# Patient Record
Sex: FEMALE | Race: OTHER | HISPANIC OR LATINO | Employment: FULL TIME | URBAN - METROPOLITAN AREA
[De-identification: names, ages, dates, MRNs, and addresses within clinical notes are randomized per-mention and may not be internally consistent; named-entity substitution may affect disease eponyms.]

---

## 2023-10-29 RX ORDER — SODIUM CHLORIDE, SODIUM LACTATE, POTASSIUM CHLORIDE, CALCIUM CHLORIDE 600; 310; 30; 20 MG/100ML; MG/100ML; MG/100ML; MG/100ML
75 INJECTION, SOLUTION INTRAVENOUS CONTINUOUS
Status: CANCELLED | OUTPATIENT
Start: 2023-10-29

## 2023-11-02 NOTE — H&P (VIEW-ONLY)
Assessment  Diagnoses and all orders for this visit:    Complex tear of medial meniscus of left knee as current injury, initial encounter    Discussion and Plan:    Explained to the patient that her MRI of the left knee reveals a medial meniscus tear with minimal osteoarthritis. She has tried and failed non operative treatments including a trial of physical therapy as well as an intra-articular corticosteroid injection. Given her persistence of symptoms despite this treatment and the findings of the MRI scan, the next step in the treatment process would be to perform an arthroscopic procedure to address the meniscal tear in the form of arthroscopic partial medial meniscectomy. She understood and all questions were answered. A thorough discussion was performed with the patient regarding the risks and benefits of the procedure. Risks specific to this procedure were discussed include but are not limited to infection, neurovascular injury, risk of anesthesia, recurrent tear of meniscus, persistent pain, no help for pre-existing osteoarthritis symptoms, as well as the need for further surgery. After this discussion all questions were answered and informed consent was obtained in the office for arthroscopic partial medial meniscectomy of the left knee. Follow up post operatively with Ovidio Mitchell PA-C    Subjective:   Patient ID: Neftaly Humphrey is a 50 y.o. female presents to the office in follow up of her left knee as well as to discuss MRI results. She was provided a steroid injection on 8/22/23 and a referral to physical therapy. She reports several days of moderate relief with the injection. She notes some improvement with the physical therapy but she continues to have left knee pain that interferes with ambulation. Pain is localized to the medial joint line. Pain is worse with stairs. Pain interferes with sleep - cannot lay with knees touching. Denies mechanical symptoms.   She denies new injury or trauma. The following portions of the patient's history were reviewed and updated as appropriate: allergies, current medications, past family history, past medical history, past social history, past surgical history and problem list.    Objective:  /77 (BP Location: Left arm, Patient Position: Sitting, Cuff Size: Standard)   Pulse 87   Ht 5' 3" (1.6 m)   Wt 88.9 kg (196 lb)   BMI 34.72 kg/m²       Left Knee Exam     Tenderness   The patient is experiencing tenderness in the medial joint line. Range of Motion   The patient has normal left knee ROM. Tests   Todd:  Medial - positive   Varus: negative Valgus: negative  Lachman:  Anterior - negative        Other   Erythema: absent  Sensation: normal  Pulse: present  Effusion: no effusion present            Physical Exam  Vitals and nursing note reviewed. Constitutional:       Appearance: She is well-developed. HENT:      Head: Normocephalic and atraumatic. Eyes:      Pupils: Pupils are equal, round, and reactive to light. Cardiovascular:      Rate and Rhythm: Normal rate and regular rhythm. Pulses: Normal pulses. Heart sounds: Normal heart sounds. Pulmonary:      Effort: Pulmonary effort is normal. No respiratory distress. Breath sounds: Normal breath sounds. Abdominal:      General: There is no distension. Palpations: Abdomen is soft. Musculoskeletal:      Cervical back: Normal range of motion and neck supple. Left knee: No effusion. Instability Tests: Medial Todd test positive. Skin:     General: Skin is warm and dry. Neurological:      Mental Status: She is alert and oriented to person, place, and time. Psychiatric:         Mood and Affect: Mood normal.         Behavior: Behavior normal.         Thought Content: Thought content normal.         Judgment: Judgment normal.       I have personally reviewed pertinent films in PACS and my interpretation is as follows.     MRI Left Knee 10/24/23: Horizontal undersurface tear of the medial meniscus. Minimal osteoarthritis.      Scribe Attestation      I,:  Lino Wong am acting as a scribe while in the presence of the attending physician.:       I,:  Nyle Fothergill, MD personally performed the services described in this documentation    as scribed in my presence.:

## 2023-11-07 PROCEDURE — 88305 TISSUE EXAM BY PATHOLOGIST: CPT | Performed by: STUDENT IN AN ORGANIZED HEALTH CARE EDUCATION/TRAINING PROGRAM

## 2023-11-07 PROCEDURE — 88342 IMHCHEM/IMCYTCHM 1ST ANTB: CPT | Performed by: STUDENT IN AN ORGANIZED HEALTH CARE EDUCATION/TRAINING PROGRAM

## 2023-11-07 PROCEDURE — 88341 IMHCHEM/IMCYTCHM EA ADD ANTB: CPT | Performed by: STUDENT IN AN ORGANIZED HEALTH CARE EDUCATION/TRAINING PROGRAM

## 2023-11-23 ENCOUNTER — ANESTHESIA EVENT (OUTPATIENT)
Dept: PERIOP | Facility: AMBULARY SURGERY CENTER | Age: 48
End: 2023-11-23
Payer: COMMERCIAL

## 2023-11-28 NOTE — PRE-PROCEDURE INSTRUCTIONS
Pre-Surgery Instructions:   Medication Instructions    albuterol (PROVENTIL HFA,VENTOLIN HFA) 90 mcg/act inhaler Uses PRN- OK to take day of surgery    cetirizine (ZyrTEC) 10 MG chewable tablet Hold day of surgery. Cholecalciferol (Vitamin D3) 75 MCG (3000 UT) TABS Stop taking 7 days prior to surgery. montelukast (SINGULAIR) 10 mg tablet Take day of surgery. omeprazole (PriLOSEC) 40 MG capsule Take day of surgery. vitamin B-12 (VITAMIN B-12) 1,000 mcg tablet Stop taking 7 days prior to surgery. Medication instructions for day surgery reviewed. Please use only a sip of water to take your instructed medications. Avoid all over the counter vitamins, supplements and NSAIDS for one week prior to surgery per anesthesia guidelines. Tylenol is ok to take as needed. You will receive a call one business day prior to surgery with an arrival time and hospital directions. If your surgery is scheduled on a Monday, the hospital will be calling you on the Friday prior to your surgery. If you have not heard from anyone by 8pm, please call the hospital supervisor through the hospital  at [phone]. Leana Srinivasan [phone]). Do not eat or drink anything after midnight the night before your surgery, including candy, mints, lifesavers, or chewing gum. Do not drink alcohol 24hrs before your surgery. Try not to smoke at least 24hrs before your surgery. Follow the pre surgery showering instructions as listed in the [facility] Surgical Experience Booklet” or otherwise provided by your surgeon's office. Do not use a blade to shave the surgical area 1 week before surgery. It is okay to use a clean electric clippers up to 24 hours before surgery. Do not apply any lotions, creams, including makeup, cologne, deodorant, or perfumes after showering on the day of your surgery. Do not use dry shampoo, hair spray, hair gel, or any type of hair products. No contact lenses, eye make-up, or artificial eyelashes.  Remove nail polish, including gel polish, and any artificial, gel, or acrylic nails if possible. Remove all jewelry including rings and body piercing jewelry. Wear causal clothing that is easy to take on and off. Consider your type of surgery. Keep any valuables, jewelry, piercings at home. Please bring any specially ordered equipment (sling, braces) if indicated. Arrange for a responsible person to drive you to and from the hospital on the day of your surgery. Visitor Guidelines discussed. Call the surgeon's office with any new illnesses, exposures, or additional questions prior to surgery. Please reference your [facility] Surgical Experience Booklet” for additional information to prepare for your upcoming surgery. Pt instructed to stop nsaids and supplements one week prior to surgery. Pt verbalized understanding of shower and med instructions.

## 2023-12-01 ENCOUNTER — ANESTHESIA (OUTPATIENT)
Dept: PERIOP | Facility: AMBULARY SURGERY CENTER | Age: 48
End: 2023-12-01
Payer: COMMERCIAL

## 2023-12-01 ENCOUNTER — HOSPITAL ENCOUNTER (OUTPATIENT)
Facility: AMBULARY SURGERY CENTER | Age: 48
Setting detail: OUTPATIENT SURGERY
Discharge: HOME | End: 2023-12-01
Attending: ORTHOPAEDIC SURGERY | Admitting: ORTHOPAEDIC SURGERY
Payer: COMMERCIAL

## 2023-12-01 VITALS
TEMPERATURE: 97.9 F | RESPIRATION RATE: 18 BRPM | HEIGHT: 63 IN | OXYGEN SATURATION: 100 % | DIASTOLIC BLOOD PRESSURE: 69 MMHG | SYSTOLIC BLOOD PRESSURE: 117 MMHG | WEIGHT: 196 LBS | BODY MASS INDEX: 34.73 KG/M2 | HEART RATE: 71 BPM

## 2023-12-01 DIAGNOSIS — S83.232D COMPLEX TEAR OF MEDIAL MENISCUS OF LEFT KNEE AS CURRENT INJURY, SUBSEQUENT ENCOUNTER: Primary | ICD-10-CM

## 2023-12-01 DIAGNOSIS — M17.12 PRIMARY OSTEOARTHRITIS OF LEFT KNEE: ICD-10-CM

## 2023-12-01 PROBLEM — S83.232A COMPLEX TEAR OF MEDIAL MENISCUS OF LEFT KNEE AS CURRENT INJURY: Status: ACTIVE | Noted: 2023-10-09

## 2023-12-01 LAB
GLUCOSE SERPL-MCNC: 100 MG/DL (ref 65–140)
GLUCOSE SERPL-MCNC: 105 MG/DL (ref 65–140)

## 2023-12-01 PROCEDURE — C9290 INJ, BUPIVACAINE LIPOSOME: HCPCS | Performed by: ANESTHESIOLOGY

## 2023-12-01 PROCEDURE — 29881 ARTHRS KNE SRG MNISECTMY M/L: CPT | Performed by: ORTHOPAEDIC SURGERY

## 2023-12-01 PROCEDURE — 29881 ARTHRS KNE SRG MNISECTMY M/L: CPT | Performed by: PHYSICIAN ASSISTANT

## 2023-12-01 PROCEDURE — 82948 REAGENT STRIP/BLOOD GLUCOSE: CPT

## 2023-12-01 RX ORDER — FENTANYL CITRATE 50 UG/ML
INJECTION, SOLUTION INTRAMUSCULAR; INTRAVENOUS AS NEEDED
Status: DISCONTINUED | OUTPATIENT
Start: 2023-12-01 | End: 2023-12-01

## 2023-12-01 RX ORDER — BUPIVACAINE HYDROCHLORIDE 2.5 MG/ML
INJECTION, SOLUTION EPIDURAL; INFILTRATION; INTRACAUDAL AS NEEDED
Status: DISCONTINUED | OUTPATIENT
Start: 2023-12-01 | End: 2023-12-01

## 2023-12-01 RX ORDER — FENTANYL CITRATE/PF 50 MCG/ML
25 SYRINGE (ML) INJECTION
Status: DISCONTINUED | OUTPATIENT
Start: 2023-12-01 | End: 2023-12-01 | Stop reason: HOSPADM

## 2023-12-01 RX ORDER — BUPIVACAINE HYDROCHLORIDE 2.5 MG/ML
INJECTION, SOLUTION EPIDURAL; INFILTRATION; INTRACAUDAL AS NEEDED
Status: DISCONTINUED | OUTPATIENT
Start: 2023-12-01 | End: 2023-12-01 | Stop reason: HOSPADM

## 2023-12-01 RX ORDER — DEXAMETHASONE SODIUM PHOSPHATE 10 MG/ML
INJECTION, SOLUTION INTRAMUSCULAR; INTRAVENOUS AS NEEDED
Status: DISCONTINUED | OUTPATIENT
Start: 2023-12-01 | End: 2023-12-01

## 2023-12-01 RX ORDER — SODIUM CHLORIDE, SODIUM LACTATE, POTASSIUM CHLORIDE, CALCIUM CHLORIDE 600; 310; 30; 20 MG/100ML; MG/100ML; MG/100ML; MG/100ML
75 INJECTION, SOLUTION INTRAVENOUS CONTINUOUS
Status: DISCONTINUED | OUTPATIENT
Start: 2023-12-01 | End: 2023-12-01 | Stop reason: HOSPADM

## 2023-12-01 RX ORDER — MIDAZOLAM HYDROCHLORIDE 2 MG/2ML
INJECTION, SOLUTION INTRAMUSCULAR; INTRAVENOUS AS NEEDED
Status: DISCONTINUED | OUTPATIENT
Start: 2023-12-01 | End: 2023-12-01

## 2023-12-01 RX ORDER — FENTANYL CITRATE/PF 50 MCG/ML
25 SYRINGE (ML) INJECTION
Status: DISCONTINUED | OUTPATIENT
Start: 2023-12-01 | End: 2023-12-01

## 2023-12-01 RX ORDER — SODIUM CHLORIDE, SODIUM LACTATE, POTASSIUM CHLORIDE, CALCIUM CHLORIDE 600; 310; 30; 20 MG/100ML; MG/100ML; MG/100ML; MG/100ML
INJECTION, SOLUTION INTRAVENOUS CONTINUOUS PRN
Status: DISCONTINUED | OUTPATIENT
Start: 2023-12-01 | End: 2023-12-01

## 2023-12-01 RX ORDER — ACETAMINOPHEN 325 MG/1
650 TABLET ORAL EVERY 6 HOURS PRN
Status: DISCONTINUED | OUTPATIENT
Start: 2023-12-01 | End: 2023-12-01 | Stop reason: HOSPADM

## 2023-12-01 RX ORDER — OXYCODONE HYDROCHLORIDE 5 MG/1
5 TABLET ORAL EVERY 4 HOURS PRN
Status: DISCONTINUED | OUTPATIENT
Start: 2023-12-01 | End: 2023-12-01 | Stop reason: HOSPADM

## 2023-12-01 RX ORDER — OXYCODONE HYDROCHLORIDE 5 MG/1
TABLET ORAL
Qty: 8 TABLET | Refills: 0 | Status: SHIPPED | OUTPATIENT
Start: 2023-12-01

## 2023-12-01 RX ORDER — PROPOFOL 10 MG/ML
INJECTION, EMULSION INTRAVENOUS AS NEEDED
Status: DISCONTINUED | OUTPATIENT
Start: 2023-12-01 | End: 2023-12-01

## 2023-12-01 RX ORDER — CEFAZOLIN SODIUM 2 G/50ML
2000 SOLUTION INTRAVENOUS ONCE
Status: COMPLETED | OUTPATIENT
Start: 2023-12-01 | End: 2023-12-01

## 2023-12-01 RX ORDER — ONDANSETRON 2 MG/ML
4 INJECTION INTRAMUSCULAR; INTRAVENOUS ONCE AS NEEDED
Status: DISCONTINUED | OUTPATIENT
Start: 2023-12-01 | End: 2023-12-01 | Stop reason: HOSPADM

## 2023-12-01 RX ORDER — ONDANSETRON 2 MG/ML
INJECTION INTRAMUSCULAR; INTRAVENOUS AS NEEDED
Status: DISCONTINUED | OUTPATIENT
Start: 2023-12-01 | End: 2023-12-01

## 2023-12-01 RX ORDER — ONDANSETRON 2 MG/ML
4 INJECTION INTRAMUSCULAR; INTRAVENOUS EVERY 6 HOURS PRN
Status: DISCONTINUED | OUTPATIENT
Start: 2023-12-01 | End: 2023-12-01 | Stop reason: HOSPADM

## 2023-12-01 RX ADMIN — DEXAMETHASONE SODIUM PHOSPHATE 10 MG: 10 INJECTION, SOLUTION INTRAMUSCULAR; INTRAVENOUS at 07:50

## 2023-12-01 RX ADMIN — OXYCODONE HYDROCHLORIDE 5 MG: 5 TABLET ORAL at 09:16

## 2023-12-01 RX ADMIN — FENTANYL CITRATE 25 MCG: 50 INJECTION INTRAMUSCULAR; INTRAVENOUS at 08:00

## 2023-12-01 RX ADMIN — FENTANYL CITRATE 50 MCG: 50 INJECTION INTRAMUSCULAR; INTRAVENOUS at 07:18

## 2023-12-01 RX ADMIN — SODIUM CHLORIDE, SODIUM LACTATE, POTASSIUM CHLORIDE, AND CALCIUM CHLORIDE: .6; .31; .03; .02 INJECTION, SOLUTION INTRAVENOUS at 07:21

## 2023-12-01 RX ADMIN — BUPIVACAINE 20 ML: 13.3 INJECTION, SUSPENSION, LIPOSOMAL INFILTRATION at 07:21

## 2023-12-01 RX ADMIN — ONDANSETRON 4 MG: 2 INJECTION INTRAMUSCULAR; INTRAVENOUS at 07:50

## 2023-12-01 RX ADMIN — MIDAZOLAM 2 MG: 1 INJECTION INTRAMUSCULAR; INTRAVENOUS at 07:18

## 2023-12-01 RX ADMIN — PROPOFOL 200 MG: 10 INJECTION, EMULSION INTRAVENOUS at 07:41

## 2023-12-01 RX ADMIN — CEFAZOLIN SODIUM 2000 MG: 2 SOLUTION INTRAVENOUS at 07:38

## 2023-12-01 RX ADMIN — BUPIVACAINE HYDROCHLORIDE 10 ML: 2.5 INJECTION, SOLUTION EPIDURAL; INFILTRATION; INTRACAUDAL; PERINEURAL at 07:21

## 2023-12-01 RX ADMIN — PROPOFOL 100 MG: 10 INJECTION, EMULSION INTRAVENOUS at 07:43

## 2023-12-01 RX ADMIN — ACETAMINOPHEN 650 MG: 325 TABLET, FILM COATED ORAL at 09:16

## 2023-12-01 NOTE — ANESTHESIA PROCEDURE NOTES
Peripheral Block    Patient location during procedure: holding area  Start time: 12/1/2023 7:21 AM  Reason for block: at surgeon's request and post-op pain management  Staffing  Performed by: Janeth Mariano MD  Authorized by: Janeth Mariano MD    Preanesthetic Checklist  Completed: patient identified, IV checked, site marked, risks and benefits discussed, surgical consent, monitors and equipment checked, pre-op evaluation and timeout performed  Peripheral Block  Patient position: supine  Prep: ChloraPrep  Patient monitoring: frequent blood pressure checks, continuous pulse oximetry and heart rate  Block type: Adductor Canal  Laterality: left  Injection technique: single-shot  Procedures: ultrasound guided, Ultrasound guidance required for the procedure to increase accuracy and safety of medication placement and decrease risk of complications.   Ultrasound permanent image saved  Needle  Needle type: Stimuplex   Needle gauge: 20 G  Needle length: 4 in  Needle localization: anatomical landmarks and ultrasound guidance  Assessment  Injection assessment: incremental injection, frequent aspiration, injected with ease, negative aspiration, negative for heart rate change, no paresthesia on injection, no symptoms of intraneural/intravenous injection and needle tip visualized at all times  Paresthesia pain: none  Post-procedure:  site cleaned  patient tolerated the procedure well with no immediate complications

## 2023-12-01 NOTE — ANESTHESIA POSTPROCEDURE EVALUATION
Post-Op Assessment Note    CV Status:  Stable    Pain management: adequate       Mental Status:  Alert and awake   Hydration Status:  Euvolemic   PONV Controlled:  Controlled   Airway Patency:  Patent     Post Op Vitals Reviewed: Yes    No anethesia notable event occurred.     Staff: CRNA               BP   108/54   Temp  97.5   Pulse  77   Resp   17   SpO2   96

## 2023-12-01 NOTE — OP NOTE
OPERATIVE REPORT  PATIENT NAME: Alexandria Maldonado    :  1975  MRN: [id number]  Pt Location: [facility] OR ROOM 06    SURGERY DATE: 2023     SURGEON: Alexsander Angelo MD     ASSISTANT: Amy Watts PA-C     NOTE: Amy Watts PA-C was present for the entire procedure and provided essential assistance with patient positioning, prepping, draping, Patient positioning, draping, wound closure, sterile dressing application under my direct supervision    NOTE: No qualified resident physician was available for assistance    PRE-OP DIAGNOSIS: Left Knee Medial Meniscus Tear    POST-OP DIAGNOSIS: Same    PROCEDURE(S): Surgical Arthroscopy Left Knee with Partial Medial Meniscectomy    ANESTHESIA STAFF: Taras Moy MD     ANESTHESIA TYPE: General with LMA with ultrasound guided adductor canal block. The adductor canal block was provided by the anesthesia staff per my request for postoperative pain control and to decrease the use of postoperative narcotic medication for pain control. COMPLICATIONS: None    FINDINGS: Medial Meniscus Tear    SPECIMEN(S):  None    ESTIMATED BLOOD LOSS: Minimal    OPERATIVE INDICATIONS:  Patient is a 50 y.o.  female with left knee pain and MRI scan showing a medial meniscus tear. After a thorough discussion of the risks and benefits of the procedure as well as alternatives to the procedure the patient elected for surgical arthroscopy left knee with partial medial meniscectomy. PROCEDURE AND TECHNIQUE:  On the day of surgery I identified the patient's left knee and marked it with my initials. The patient was taken back to the operating room where general anesthesia was induced with a laryngeal mask airway and 2 grams of IV Cefazolin were given. The patient was positioned in the supine position, the knee was examined and found to have no effusion with full range of motion and no instability.  A tourniquet was not utilized and after a timeout for safety a standard anterolateral arthroscopic portal was made. No effusion was returned. Patellofemoral evaluation was performed and no articular cartilage injury was seen with a centrally tracking patella. Medial compartment was entered and found to have mild grade 2 changes of the articular cartilage of the medial femoral condyle with a complex tear of the posterior horn to the body of the medial meniscus. A partial medial meniscectomy was performed using a biting device and the shaver to a stable border. The ACL and PCL were found to be intact in the inter-condylar notch. Lateral compartment was entered  and found to have no articular cartilage injury with no lateral meniscus tear. No loose body was seen in the medial or lateral gutter or in the supra-patellar pouch. The knee was irrigated and then the scope was withdrawn. Wounds were closed with 4-0 Monocryl and Steri-Strips and sterile dressings were placed. Patient will be weightbearing with crutches for support. 20 cc of 1/4% bupivacaine was injected for post-op analgesia.       PATIENT DISPOSITION:  Stable to PACU      SIGNATURE: Jian Call MD  DATE: December 1, 2023  TIME: 8:09 AM

## 2023-12-01 NOTE — INTERVAL H&P NOTE
H&P reviewed. After examining the patient I find no changes in the patients condition since the H&P had been written.     Vitals:    12/01/23 0703   BP: 120/78   Pulse: 92   Resp: 18   Temp: (!) 96.5 °F (35.8 °C)   SpO2: 97%

## 2023-12-01 NOTE — DISCHARGE INSTR - AVS FIRST PAGE
What to Expect Before and After Knee Arthroscopy  You are being scheduled for an outpatient knee arthroscopy by Dr. Jessica Coffman. This surgery is done most commonly to treat meniscal tears; however the technique allows Dr Jessica Coffman to treat a variety of problems inside the knee with small incisions and using a scope/camera instrument. Here is some information which may help to answer questions that you may have. Please do not hesitate to reach out to our team to answer questions not addressed here. Before Surgery  You will be contacted the evening prior to your surgery to confirm the scheduled time of the procedure and when to arrive at the hospital.   Do not eat or drink anything after midnight the night before your surgery so that the anesthesia can be performed safely. You typically do not knee any type of brace following the surgery unless specifically instructed by Dr Araseli Wilkins team.  René Vivas will meet the anesthesiologist the morning of the surgery. The surgery is performed under a general anesthetic (“going to sleep”) but they will also offer you a regional block (injection in the leg to numb the leg) to help control your post-operative pain. Unfortunately the block will eventually wear off (typically 12-24 hours after the surgery) but can still be very helpful in managing the pain early on. You will also be provided with a prescription for narcotic pain medication for a short period of time (5 day supply) as some patients require this for post-surgical pain control. We typically will not provide more of that medication at the follow up as to not risk causing dependency and the acute pain should be improved by then. After Surgery  The surgery typically takes 20-30 minutes. When surgery is completed, Dr. Jessica Coffman will update your family and friends on your condition and progress.  You will remain in the recovery room for between 30-60 minutes or until the anesthesia has worn off and your blood pressure and pulse are stable. You will then be brought to the post-op area and see your family/friends. You will be discharged home with crutches and should be able to put all the weight on the leg that you can tolerated. The crutches should be able to be discontinued in 48-72 hours but if you need them for support for a little longer that is fine. As each surgery is unique, Dr Anabell Bauman and his team will be clear if this is to change for your specific procedure. Ice applied to the knee for 20 minutes on and 20 minutes off is helpful to control pain and swelling for some patients and care should be given to make sure that ice is not in direct contact with the skin for fear of causing frostbite. The day after the surgery it is okay to remove the dressings and shower (it is okay if the incisions get wet, just try not to submerge them like in a bath for about 2 weeks following the surgery). If you don’t feel stable without the crutches to shower you can consider using a shower chair (plastic patio chair works well). The incisions will have paper strips covering absorbable sutures and the strips will fall off over time. Make sure you dry everything after showering and a clean ace with a dry bandage (gauze from the pharmacy works great) can be placed after showering to prevent abrasion over the incisions but is not necessarily required. Most patients just leave the wounds open to the air and this is safe to do. It is normal to have some clear or even bloody drainage from the incisions but if you notice a foul odor or purulent drainage from your incision or your temperature goes above 101.5 degrees please contact the office to make sure an infection is not occurring. Pain Control    While pain is an individual experience and difficult to predict, a narcotic pain medication is often required to help manage the pain.   A prescription for this will be provided but only a limited number of pills will be prescribed to help manage the acute phase of recovery. Outside of the acute phase (5 or so days), this medication will not be indicated. In addition to the narcotic pain medication, it is safe to use an anti-inflammatory (unless the patient has a medical condition that would not allow safe use of this mediation). This includes the Advil, Motrin, Ibuprofen and Alleve category of medications. Simply follow the over the counter dosing on the package and use as indicated as another adjunct. Importantly since these medications are all very similar, use only one of them. Tylenol is a separate medication that can be utilized as well and can be taken at the same time as the other medication or given in a "staggered" manner. Just make sure that you follow the dosing on the over the counter bottle instructions. Also make sure that the pain medication prescribed by Dr Baldo Daniel team does not contain acetaminophen (this is found in Percocet and Vicodin). Typically we do not prescribe those types of pain medications but if for some reason that has been prescribed DO NOT add more Tylenol (acetaminophen) as you could end up taking too much of that medication. How to [place] in the First Week  You are encouraged to return to your normal eating and sleeping patterns as soon as possible. It is important for you to be active in order to control your weight and muscle tone so don’t be afraid to move about the house as much as you can tolerate and even consider short walks using the crutches for support if needed. We will limit higher impact activities such as running for 4-6 weeks but gradually increasing your activity is safe as long as your knee does not start swelling up or causing pain. If that does happen back off of the activity. You might be able to return to work within several days however this differs from patient to patient. If your job requires heavy lifting, manual labor or climbing, there may be a delay for several weeks. Your first post-operative appointment will be with Dr. Eli Lazar physician assistant (PA) a few days after the surgery where she will review the intra-operative pictures from the surgery and make sure things are going well. Please understand that it is quite common to still experience pain at that time but the pain should be steadily improving. The majority of our routine knee arthroscopy patients do not require formal Physical Therapy and a simple gradual return to activity is adequate. If you wish to attend PT or the details of you procedure will require you to do so, the first post-op visit is a perfect time to get that scheduled and started. Hopefully this has provided you with information that will help you prepare and get thru the procedure. Please do not hesitate to reach out to our team to answer questions not addressed here. Also remember that these are general guidelines and each surgery is unique so some changes to the above information may be required.

## 2023-12-01 NOTE — ANESTHESIA PREPROCEDURE EVALUATION
Procedure:  KNEE ARTHROSCOPIC PARTIAL MEDIAL MENISCECTOMY, LEFT (Left: Knee)    Relevant Problems   ENDO   (+) Diabetes mellitus, type 2 (HCC)      GI/HEPATIC   (+) Gastroesophageal reflux disease      MUSCULOSKELETAL   (+) Primary osteoarthritis of left knee      PULMONARY   (+) Asthma      BMI 35    Physical Exam    Airway    Mallampati score: II  TM Distance: >3 FB  Neck ROM: full     Dental       Cardiovascular  Cardiovascular exam normal    Pulmonary  Pulmonary exam normal     Other Findings  post-pubertal.      Anesthesia Plan  ASA Score- 3     Anesthesia Type- general with ASA Monitors. Additional Monitors:     Airway Plan: LMA. Comment: + PNB. Plan Factors-    Chart reviewed. EKG reviewed. Existing labs reviewed. Patient summary reviewed. Induction- intravenous. Postoperative Plan-     Informed Consent- Anesthetic plan and risks discussed with patient. I personally reviewed this patient with the CRNA. Discussed and agreed on the Anesthesia Plan with the CRNA. Hakan Rodriguez

## 2023-12-04 ENCOUNTER — OFFICE VISIT (OUTPATIENT)
Dept: OBGYN CLINIC | Facility: OTHER | Age: 48
End: 2023-12-04

## 2023-12-04 VITALS
BODY MASS INDEX: 34.72 KG/M2 | HEIGHT: 63 IN | DIASTOLIC BLOOD PRESSURE: 78 MMHG | HEART RATE: 94 BPM | SYSTOLIC BLOOD PRESSURE: 115 MMHG

## 2023-12-04 DIAGNOSIS — Z48.89 AFTERCARE FOLLOWING SURGERY: ICD-10-CM

## 2023-12-04 DIAGNOSIS — S83.232A COMPLEX TEAR OF MEDIAL MENISCUS OF LEFT KNEE AS CURRENT INJURY, INITIAL ENCOUNTER: Primary | ICD-10-CM

## 2023-12-04 PROCEDURE — 99024 POSTOP FOLLOW-UP VISIT: CPT | Performed by: ORTHOPAEDIC SURGERY

## 2023-12-04 NOTE — PROGRESS NOTES
Assessment  Diagnoses and all orders for this visit:    Complex tear of medial meniscus of left knee as current injury, initial encounter    Aftercare following surgery        Discussion and Plan:    Patient presented on exam today. She is progressing well. I recommend she discontinues her crutches and begin fully weightbearing as tolerated as she needs to get her knee moving. I offered her physical therapy if she needs assistance with this, she kindly declined. I will plan to see her back in 6 weeks for repeat evaluation, sooner if any issues or concerns arise. Subjective:   Patient ID: Mehdi Crapio is a 50 y.o. female      HPI  Patient is here today for follow-up status post left knee arthroscopy, partial medial menisectomy. Patient has been doing well, she has been taking Tylenol around-the-clock for pain with oxycodone as needed. She has been using the crutches to assist with ambulation, she has walked at home without the crutches last few days. She notices an increase amount of pain when she is fully weightbearing. Otherwise, she has been doing well. The following portions of the patient's history were reviewed and updated as appropriate: allergies, current medications, past family history, past medical history, past social history, past surgical history and problem list.    Objective:  /78 (BP Location: Left arm, Patient Position: Sitting, Cuff Size: Large)   Pulse 94   Ht 5' 3" (1.6 m)   BMI 34.72 kg/m²       Left Knee Exam     Range of Motion   Extension:  normal   Flexion:  normal     Other   Erythema: absent  Scars: present  Sensation: normal  Pulse: present  Swelling: none    Comments:  No calf tenderness            Physical Exam  Constitutional:       Appearance: Normal appearance. HENT:      Head: Normocephalic. Pulmonary:      Breath sounds: Normal breath sounds. Neurological:      Mental Status: She is alert and oriented to person, place, and time. Psychiatric:         Behavior: Behavior normal.         Thought Content:  Thought content normal.         Judgment: Judgment normal.           Scribe Attestation      I,:  Valentine Shepherd am acting as a scribe while in the presence of the attending physician.:       I,:  Yesi Davila MD personally performed the services described in this documentation    as scribed in my presence.:

## 2024-05-23 ENCOUNTER — OFFICE VISIT (OUTPATIENT)
Dept: PODIATRY | Facility: CLINIC | Age: 49
End: 2024-05-23
Payer: COMMERCIAL

## 2024-05-23 VITALS — WEIGHT: 202 LBS | RESPIRATION RATE: 18 BRPM | HEIGHT: 63 IN | BODY MASS INDEX: 35.79 KG/M2

## 2024-05-23 DIAGNOSIS — E11.42 DIABETIC POLYNEUROPATHY ASSOCIATED WITH TYPE 2 DIABETES MELLITUS (HCC): Primary | ICD-10-CM

## 2024-05-23 PROCEDURE — 99203 OFFICE O/P NEW LOW 30 MIN: CPT | Performed by: PODIATRIST

## 2024-05-23 RX ORDER — GABAPENTIN 300 MG/1
300 CAPSULE ORAL
Qty: 30 CAPSULE | Refills: 0 | Status: SHIPPED | OUTPATIENT
Start: 2024-05-23 | End: 2024-06-22

## 2024-05-23 NOTE — PROGRESS NOTES
"Ambulatory Visit  Name: Monserrat Smith      : 1975      MRN: [id number]  Encounter Provider: Eric Blue DPM  Encounter Date: 2024   Encounter department: [facility] [place]    Explained to patient that her pedal discomfort is likely due to neuropathy.  Unable to totally rule out plantar fascial pain but it appears as if numbness and tingling is her chief complaint.  For this reason, the patient was placed on gabapentin 300 mg at bedtime.  She will take this medication for 30 days and then be reassessed.  Urged patient to refrain from walking barefoot.  Vascular status is within normal limits but sensorium is diminished significantly per [place] monofilament.    Assessment & Plan   1. Diabetic polyneuropathy associated with type 2 diabetes mellitus (HCC)  -     gabapentin (Neurontin) 300 mg capsule; Take 1 capsule (300 mg total) by mouth daily at bedtime      History of Present Illness     Monserrat Smith is a 49 y.o. female who presents with numbness tingling in both feet as well as mild heel pain.  Patient is a type II diabetic utilizing metformin successfully for control.  She states her feet have been an issue for years.  She notes that she has tarsal tunnel surgery right ankle in  by Dr. Gallo.  She also had orthotics made years back but does not wear them as she always found them uncomfortable.  She also relates periods of pain in her heels.    I personally reviewed an A1c dated 5/15/2024.  It was 6.5.    I personally reviewed an A1c dated 2023.  It was 6.3.    Past medical history is positive for GERD but negative for GI ulceration.  She denies back disorders.    Review of Systems   Gastrointestinal:         GERD   Musculoskeletal: Negative.    Neurological:  Positive for numbness.        Paresthesia   Psychiatric/Behavioral: Negative.             Objective     Resp 18   Ht 5' 3\" (1.6 m)   Wt 91.6 kg (202 lb)   BMI 35.78 kg/m²     Physical " Exam  Cardiovascular:      Pulses: no weak pulses.           Dorsalis pedis pulses are 2+ on the right side and 2+ on the left side.        Posterior tibial pulses are 2+ on the right side and 2+ on the left side.   Feet:      Right foot:      Skin integrity: No ulcer, skin breakdown, erythema, warmth, callus or dry skin.      Left foot:      Skin integrity: No ulcer, skin breakdown, erythema, warmth, callus or dry skin.         Diabetic Foot Exam    Patient's shoes and socks removed.    Right Foot/Ankle   Right Foot Inspection  Skin Exam: skin normal and skin intact. No dry skin, no warmth, no callus, no erythema, no maceration, no abnormal color, no pre-ulcer, no ulcer and no callus.     Toe Exam: ROM and strength within normal limits.     Sensory   Vibration: intact  Proprioception: intact  Monofilament testing: diminished    Vascular  Capillary refills: < 3 seconds  The right DP pulse is 2+. The right PT pulse is 2+.     Right Toe  - Comprehensive Exam  Ecchymosis: none  Arch: normal  Hammertoes: absent  Claw Toes: absent  Swelling: none   Tenderness: plantar fascia       Left Foot/Ankle  Left Foot Inspection  Skin Exam: skin normal and skin intact. No dry skin, no warmth, no erythema, no maceration, normal color, no pre-ulcer, no ulcer and no callus.     Toe Exam: ROM and strength within normal limits.     Sensory   Vibration: intact  Proprioception: intact  Monofilament testing: diminished    Vascular  Capillary refills: < 3 seconds  The left DP pulse is 2+. The left PT pulse is 2+.     Left Toe  - Comprehensive Exam  Ecchymosis: none  Arch: normal  Hammertoes: absent  Claw toes: absent  Swelling: none   Tenderness: plantar fascia       Assign Risk Category  No deformity present  No loss of protective sensation  No weak pulses  Risk: 0    Administrative Statements

## 2024-07-02 ENCOUNTER — OFFICE VISIT (OUTPATIENT)
Dept: PODIATRY | Facility: CLINIC | Age: 49
End: 2024-07-02
Payer: COMMERCIAL

## 2024-07-02 VITALS
WEIGHT: 202 LBS | RESPIRATION RATE: 18 BRPM | DIASTOLIC BLOOD PRESSURE: 75 MMHG | HEIGHT: 63 IN | SYSTOLIC BLOOD PRESSURE: 112 MMHG | HEART RATE: 97 BPM | BODY MASS INDEX: 35.79 KG/M2

## 2024-07-02 DIAGNOSIS — M76.72 PERONEAL TENDINITIS OF LEFT LOWER EXTREMITY: ICD-10-CM

## 2024-07-02 DIAGNOSIS — M76.71 PERONEAL TENDINITIS OF RIGHT LOWER EXTREMITY: ICD-10-CM

## 2024-07-02 DIAGNOSIS — E11.42 DIABETIC POLYNEUROPATHY ASSOCIATED WITH TYPE 2 DIABETES MELLITUS (HCC): Primary | ICD-10-CM

## 2024-07-02 PROCEDURE — 20552 NJX 1/MLT TRIGGER POINT 1/2: CPT | Performed by: PODIATRIST

## 2024-07-02 PROCEDURE — 99213 OFFICE O/P EST LOW 20 MIN: CPT | Performed by: PODIATRIST

## 2024-07-02 RX ORDER — LIDOCAINE HYDROCHLORIDE 10 MG/ML
1 INJECTION, SOLUTION INFILTRATION; PERINEURAL
Status: SHIPPED | OUTPATIENT
Start: 2024-07-02

## 2024-07-02 RX ORDER — TRIAMCINOLONE ACETONIDE 40 MG/ML
20 INJECTION, SUSPENSION INTRA-ARTICULAR; INTRAMUSCULAR
Status: SHIPPED | OUTPATIENT
Start: 2024-07-02

## 2024-07-02 RX ORDER — GABAPENTIN 300 MG/1
300 CAPSULE ORAL 2 TIMES DAILY
Qty: 120 CAPSULE | Refills: 1 | Status: SHIPPED | OUTPATIENT
Start: 2024-07-02 | End: 2024-10-30

## 2024-07-02 RX ADMIN — TRIAMCINOLONE ACETONIDE 20 MG: 40 INJECTION, SUSPENSION INTRA-ARTICULAR; INTRAMUSCULAR at 15:00

## 2024-07-02 RX ADMIN — LIDOCAINE HYDROCHLORIDE 1 ML: 10 INJECTION, SOLUTION INFILTRATION; PERINEURAL at 15:00

## 2024-07-02 NOTE — PROGRESS NOTES
Patient presents for assessment.  At the last visit, patient was provide gabapentin 300 mg daily due to paresthesia from diabetic neuropathy.  The patient became drowsy but could tolerate the medication but did not find it helpful.    For this reason, prescribed gabapentin 300 mg twice daily for 2 months and we will reassess in 2 months.    Patient has a new disorder.  She has pain at the fifth metatarsal bases of each foot with symptoms consistent with peroneal brevis tendinitis.  No trauma related to cause this pain.    Treatment: Injected fifth metatarsal base bilateral but 0.5 cc Kenalog 40 along with 1 cc 1% Xylocaine.    Foot/lower extremity injection    Performed by: Eric Blue DPM  Authorized by: Eric Blue DPM    Procedure:     Other Assisting Provider: No      Verbal consent obtained?: Yes      Risks and benefits: Risks, benefits and alternatives were discussed      Consent given by:  Patient    Patient identity confirmed:  Verbally with patient    Supporting Documentation:     Indications:  Pain    Procedure Details:                Ethyl Chloride was applied      Needle size: 25 G G    Ultrasound Guidance: no      Approach:  Lateral    Laterality:  Right    Location: tendon insertion      Tendon Structures: Peroneus Brevis      Injection Information:       Medications:  1 mL lidocaine 1 %; 20 mg triamcinolone acetonide 40 mg/mL  Foot/lower extremity injection    Performed by: Eric Blue DPM  Authorized by: Eric Blue DPM    Procedure:     Other Assisting Provider: No      Verbal consent obtained?: Yes      Risks and benefits: Risks, benefits and alternatives were discussed      Consent given by:  Patient    Patient states understanding of procedure being performed: Yes      Patient identity confirmed:  Verbally with patient    Supporting Documentation:     Indications:  Pain    Procedure Details:                Ethyl Chloride was applied      Needle size: 25 G G    Ultrasound Guidance:  no      Approach:  Lateral    Laterality:  Left    Cyst Aspiration/Injection: No      Location: tendon insertion      Tendon Structures: Peroneus Brevis      Injection Information:       Medications:  1 mL lidocaine 1 %; 20 mg triamcinolone acetonide 40 mg/mL

## 2024-08-19 DIAGNOSIS — E11.42 DIABETIC POLYNEUROPATHY ASSOCIATED WITH TYPE 2 DIABETES MELLITUS (HCC): ICD-10-CM

## 2024-08-20 RX ORDER — GABAPENTIN 300 MG/1
300 CAPSULE ORAL
Qty: 30 CAPSULE | Refills: 5 | Status: SHIPPED | OUTPATIENT
Start: 2024-08-20

## (undated) DEVICE — ACE WRAP 4 IN UNSTERILE

## (undated) DEVICE — ACE WRAP 6 IN UNSTERILE

## (undated) DEVICE — TIBURON EXTREMITY SHEET: Brand: CONVERTORS

## (undated) DEVICE — GLOVE SRG BIOGEL ECLIPSE 7

## (undated) DEVICE — ABDOMINAL PAD: Brand: DERMACEA

## (undated) DEVICE — SUT MONOCRYL 4-0 PS-2 27 IN Y426H

## (undated) DEVICE — INTENDED FOR TISSUE SEPARATION, AND OTHER PROCEDURES THAT REQUIRE A SHARP SURGICAL BLADE TO PUNCTURE OR CUT.: Brand: BARD-PARKER ® CARBON RIB-BACK BLADES

## (undated) DEVICE — GLOVE SRG BIOGEL 7.5

## (undated) DEVICE — BLADE SHAVER DISSECTOR  3.5MM 13CM CRV COOLCUT

## (undated) DEVICE — BLADE SHAVER DISSECTOR 3.5MM 13CM COOLCUT

## (undated) DEVICE — IMPERVIOUS STOCKINETTE: Brand: DEROYAL

## (undated) DEVICE — SKN PRP WNG SPNGE PVP SCRB STR: Brand: MEDLINE INDUSTRIES, INC.

## (undated) DEVICE — SYRINGE 30ML LL

## (undated) DEVICE — GAUZE SPONGES,16 PLY: Brand: CURITY

## (undated) DEVICE — GLOVE INDICATOR PI UNDERGLOVE SZ 7.5 BLUE

## (undated) DEVICE — BETHLEHEM UNIVERSAL  ARTHRO PK: Brand: CARDINAL HEALTH

## (undated) DEVICE — DECANTER: Brand: UNBRANDED

## (undated) DEVICE — TUBING ARTHROSCOPY REDUCE PATIENT

## (undated) DEVICE — PADDING CAST 4 IN  COTTON STRL

## (undated) DEVICE — 3M™ STERI-STRIP™ REINFORCED ADHESIVE SKIN CLOSURES, R1547, 1/2 IN X 4 IN (12 MM X 100 MM), 6 STRIPS/ENVELOPE: Brand: 3M™ STERI-STRIP™

## (undated) DEVICE — OCCLUSIVE GAUZE STRIP,3% BISMUTH TRIBROMOPHENATE IN PETROLATUM BLEND: Brand: XEROFORM